# Patient Record
Sex: MALE
[De-identification: names, ages, dates, MRNs, and addresses within clinical notes are randomized per-mention and may not be internally consistent; named-entity substitution may affect disease eponyms.]

---

## 2017-09-25 ENCOUNTER — HOSPITAL ENCOUNTER (EMERGENCY)
Dept: HOSPITAL 42 - ED | Age: 27
Discharge: HOME | End: 2017-09-25
Payer: COMMERCIAL

## 2017-09-25 VITALS
OXYGEN SATURATION: 100 % | SYSTOLIC BLOOD PRESSURE: 112 MMHG | HEART RATE: 90 BPM | DIASTOLIC BLOOD PRESSURE: 82 MMHG | RESPIRATION RATE: 16 BRPM

## 2017-09-25 VITALS — BODY MASS INDEX: 21.5 KG/M2

## 2017-09-25 VITALS — TEMPERATURE: 98.6 F

## 2017-09-25 DIAGNOSIS — F17.210: ICD-10-CM

## 2017-09-25 DIAGNOSIS — J02.9: Primary | ICD-10-CM

## 2017-09-25 NOTE — ED PDOC
Arrival/HPI





- General


Historian: Patient


EM Caveat: Acuity of Condition





- History of Present Illness


Time/Duration: 24 hours


Symptom Onset: Sudden


Symptom Course: Unchanged


Quality: Dullness


Severity Level: Mild


Activities at Onset: Rest


Context: Home





- General


Chief Complaint: Flu-like Symptoms


Time Seen by Provider: 09/25/17 20:58





- History of Present Illness


Narrative History of Present Illness (Text): 





09/25/17 21:14


27yo M with hx of ETOH use presents with flu-like symptoms that began earlier 

this morning. Pt states that he woke up this morning with throat and bone pain, 

and then also had a headache, fever and chills. Pt denied n/v/d, cp, sob, cough

, rhinorrhea, weakness. Patient denied sick contacts, recent travel. Last ETOH 

use was Saturday, but denies any withdrawal symptoms. Denies tobacco use but 

last marijuana use was 2months ago.  (NEIL KINNEY)





Past Medical History





- Provider Review


Nursing Documentation Reviewed: Yes





- Infectious Disease


Hx of Infectious Diseases: None





- Past Medical History


Past Medical History: No Previous





- Psychiatric


Hx Substance Use: Yes (marijuana and ETOH)





- Anesthesia


Hx Anesthesia: No





Family/Social History





- Physician Review


Nursing Documentation Reviewed: Yes


Family/Social History: No Known Family HX


Smoking Status: Current Some Days Smoker


Hx Alcohol Use: Yes


Hx Substance Use: Yes (marijuana)





Allergies/Home Meds


Allergies/Adverse Reactions: 


Allergies





No Known Allergies Allergy (Verified 12/01/16 01:30)


 











Review of Systems





- Physician Review


All systems were reviewed & negative as marked: Yes





- Review of Systems


Constitutional: Fevers


ENT: Sore Throat.  absent: Rhinorrhea


Respiratory: absent: SOB, Cough, Sputum


Cardiovascular: absent: Chest Pain


Gastrointestinal: absent: Abdominal Pain, Nausea, Vomiting


Musculoskeletal: Myalgias


Neurological: Headache.  absent: Dizziness





Physical Exam


Vital Signs Reviewed: Yes


Appearance: Positive for: Well-Appearing


Pain Distress: None


Mental Status: Positive for: Alert and Oriented X 3





- Systems Exam


Head: Present: Atraumatic, Normocephalic


Pupils: Present: PERRL


Extroacular Muscles: Present: EOMI


Conjunctiva: Present: Normal


Ears: Present: NORMAL TM


Mouth: Present: Moist Mucous Membranes


Pharnyx: Present: EXUDATE


Nose (External): Present: Atraumatic


Nose (Internal): No: Boggy, Clear Mucous, Rhinorrhea


Neck: Present: Normal Range of Motion.  No: Lymphadenopathy


Respiratory/Chest: Present: Clear to Auscultation, Good Air Exchange


Cardiovascular: Present: Regular Rate and Rhythm, Normal S1, S2


Abdomen: Present: Normal Bowel Sounds.  No: Tenderness, Distention


Back: Present: Normal Inspection


Upper Extremity: Present: Normal Inspection


Lower Extremity: Present: Normal Inspection


Neurological: Present: Speech Normal, Motor Func Grossly Intact


Skin: Present: Warm, Dry


Vital Signs











  Temp Pulse Resp BP Pulse Ox


 


 09/25/17 20:53  98.6 F  111 H  18  110/74  98














Medical Decision Making


ED Course and Treatment: 





09/25/17 21:32


Impression:


27yo M w/ no PMH presents with flu-like symptoms 





Differential Diagnosis included but are not limited to: 


influenza virus infection vs strep pharyngitis vs infectious monon





Plan:





-- Reassess and disposition








Progress Notes:


]


09/25/17 21:36


 (NEIL KINNEY)


Patient Seen With Resident:


In agreement with resident note which contains more details about the patient. 

Patient was seen and evaluated with resident. Came up with plan and treatment 

together.





09/25/17 22:04


Seen and examined with the resident. Our history and physical exam reveals a 

gentleman with a severe sore throat since this morning. There is some mild 

pharyngeal injection with no exudate and no peritonsillar abscess. (Crescencio Lindo)





Disposition/Present on Arrival





- Present on Arrival


Any Indicators Present on Arrival: No


History of DVT/PE: No


History of Uncontrolled Diabetes: No


Urinary Catheter: No


History of Decub. Ulcer: No


History Surgical Site Infection Following: None





- Disposition


Have Diagnosis and Disposition been Completed?: Yes


Disposition Time: 22:00


Patient Plan: Discharge





- Disposition


Diagnosis: 


 Pharyngitis





Disposition: HOME/ ROUTINE


Patient Problems: 


 Current Active Problems











Problem Status Onset


 


Pharyngitis Acute  











Condition: GOOD


Additional Instructions: 


- take prescribed Amoxicillin 250mg three times a day for one week


- follow up with your PMD within 2 weeks for management 


- if you experience worsening fevers, pain, or shortness of breath, please go 

to ER for evaluation


Prescriptions: 


Amoxicillin [Amoxil 250 mg Cap] 250 mg PO Q8H #21 cap


Referrals: 


PCP,NO [Primary Care Provider] - Follow up with primary


Forms:  [employer] [facility] (English)

## 2018-02-27 ENCOUNTER — HOSPITAL ENCOUNTER (EMERGENCY)
Dept: HOSPITAL 42 - ED | Age: 28
Discharge: HOME | End: 2018-02-27
Payer: COMMERCIAL

## 2018-02-27 VITALS
RESPIRATION RATE: 16 BRPM | OXYGEN SATURATION: 99 % | HEART RATE: 87 BPM | TEMPERATURE: 98.4 F | SYSTOLIC BLOOD PRESSURE: 122 MMHG | DIASTOLIC BLOOD PRESSURE: 74 MMHG

## 2018-02-27 VITALS — BODY MASS INDEX: 21.5 KG/M2

## 2018-02-27 DIAGNOSIS — R21: Primary | ICD-10-CM

## 2018-02-27 NOTE — ED PDOC
Arrival/HPI





- General


Chief Complaint: Abnormal Skin Integrity


Time Seen by Provider: 02/27/18 14:42


Historian: Patient





- History of Present Illness


Narrative History of Present Illness (Text): 





02/27/18 14:54


26yo male with no PMx who present with complaint of pruritic rash to his 

diffuse body since July last year. States he never saw a Doctor. Denies any new 

inciting factors, tongue swelling, SOB, chest pain, fever, neck pain, any other 

complaint.





Past Medical History





- Provider Review


Nursing Documentation Reviewed: Yes





- Infectious Disease


Hx of Infectious Diseases: None





- Past Medical History


Past Medical History: No Previous





- Musculoskeletal/Rheumatological


Hx Falls: No





- Psychiatric


Hx Psychophysiologic Disorder: No


Hx Substance Use: Yes (marijuana)





- Anesthesia


Hx Anesthesia: No





Family/Social History





- Physician Review


Nursing Documentation Reviewed: Yes


Family/Social History: Unknown Family HX


Smoking Status: Current Some Days Smoker


Hx Alcohol Use: Yes


Hx Substance Use: Yes (marijuana)





Allergies/Home Meds


Allergies/Adverse Reactions: 


Allergies





No Known Allergies Allergy (Verified 02/27/18 14:13)


 











Review of Systems





- Physician Review


All systems were reviewed & negative as marked: Yes





- Review of Systems


Constitutional: Normal


Eyes: Normal


ENT: Normal


Respiratory: Normal


Cardiovascular: Normal


Gastrointestinal: Normal


Genitourinary Male: Normal


Musculoskeletal: Normal


Skin: Rash, Pruritis


Neurological: Normal


Endocrine: Normal


Hemo/Lymphatic: Normal


Psychiatric: Normal





Physical Exam


Vital Signs Reviewed: Yes





Vital Signs











  Temp Pulse Resp BP Pulse Ox


 


 02/27/18 14:17  98.4 F  87  16  122/74  99











Temperature: Afebrile


Blood Pressure: Normal


Pulse: Regular


Respiratory Rate: Normal


Appearance: Positive for: Well-Appearing, Non-Toxic, Comfortable


Pain Distress: None


Mental Status: Positive for: Alert and Oriented X 3





- Systems Exam


Head: Present: Atraumatic, Normocephalic


Pupils: Present: PERRL


Extroacular Muscles: Present: EOMI


Conjunctiva: Present: Normal


Mouth: Present: Moist Mucous Membranes


Neck: Present: Normal Range of Motion


Respiratory/Chest: Present: Clear to Auscultation, Good Air Exchange.  No: 

Respiratory Distress, Accessory Muscle Use


Cardiovascular: Present: Regular Rate and Rhythm, Normal S1, S2.  No: Murmurs


Abdomen: Present: Normal Bowel Sounds.  No: Tenderness, Distention, Peritoneal 

Signs


Back: Present: Normal Inspection


Upper Extremity: Present: Normal Inspection.  No: Cyanosis, Edema


Lower Extremity: Present: Normal Inspection.  No: Edema


Neurological: Present: GCS=15, CN II-XII Intact, Speech Normal


Skin: Present: Warm, Dry, Rashes (Erythematous macular rash noted to diffuse 

body), Normal Color


Psychiatric: Present: Alert, Oriented x 3, Normal Insight, Normal Concentration





Disposition/Present on Arrival





- Present on Arrival


Any Indicators Present on Arrival: No


History of DVT/PE: No


History of Uncontrolled Diabetes: No


Urinary Catheter: No


History of Decub. Ulcer: No


History Surgical Site Infection Following: None





- Disposition


Have Diagnosis and Disposition been Completed?: Yes


Diagnosis: 


 Rash





Disposition: HOME/ ROUTINE


Disposition Time: 15:00


Patient Plan: Discharge


Condition: STABLE


Discharge Instructions (ExitCare):  Skin Rash (DC)


Additional Instructions: 


Follow up with a Dermatologist


Return to ED for any new symptoms


Prescriptions: 


DiphenhydrAMINE [Benadryl] 25 mg PO Q4 #30 cap


Famotidine [Pepcid] 20 mg PO DAILY #15 tab


predniSONE [Prednisone] 20 mg PO BID #8 tab


Referrals: 


PCP,NO [Primary Care Provider] - Follow up with primary


Sloan Thompson MD [Staff Provider] - Follow up with primary